# Patient Record
Sex: MALE | Race: OTHER | URBAN - METROPOLITAN AREA
[De-identification: names, ages, dates, MRNs, and addresses within clinical notes are randomized per-mention and may not be internally consistent; named-entity substitution may affect disease eponyms.]

---

## 2019-05-26 ENCOUNTER — EMERGENCY (EMERGENCY)
Facility: HOSPITAL | Age: 34
LOS: 0 days | Discharge: HOME | End: 2019-05-26
Payer: MEDICAID

## 2019-05-26 VITALS
OXYGEN SATURATION: 100 % | HEART RATE: 67 BPM | SYSTOLIC BLOOD PRESSURE: 120 MMHG | DIASTOLIC BLOOD PRESSURE: 69 MMHG | TEMPERATURE: 98 F | RESPIRATION RATE: 18 BRPM

## 2019-05-26 VITALS
SYSTOLIC BLOOD PRESSURE: 134 MMHG | HEIGHT: 72 IN | OXYGEN SATURATION: 100 % | DIASTOLIC BLOOD PRESSURE: 57 MMHG | RESPIRATION RATE: 16 BRPM | WEIGHT: 184.97 LBS | HEART RATE: 62 BPM | TEMPERATURE: 98 F
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
## 2019-05-26 DIAGNOSIS — M25.572 PAIN IN LEFT ANKLE AND JOINTS OF LEFT FOOT: ICD-10-CM

## 2019-05-26 DIAGNOSIS — S82.832A OTHER FRACTURE OF UPPER AND LOWER END OF LEFT FIBULA, INITIAL ENCOUNTER FOR CLOSED FRACTURE: ICD-10-CM

## 2019-05-26 PROCEDURE — 73600 X-RAY EXAM OF ANKLE: CPT | Mod: 26,59,LT

## 2019-05-26 PROCEDURE — 73590 X-RAY EXAM OF LOWER LEG: CPT | Mod: 26,LT

## 2019-05-26 PROCEDURE — 99284 EMERGENCY DEPT VISIT MOD MDM: CPT

## 2019-05-26 PROCEDURE — 73610 X-RAY EXAM OF ANKLE: CPT | Mod: 26,76,LT

## 2019-05-26 PROCEDURE — 73630 X-RAY EXAM OF FOOT: CPT | Mod: 26,LT

## 2019-05-26 RX ORDER — ACETAMINOPHEN 500 MG
2 TABLET ORAL
Qty: 20 | Refills: 0
Start: 2019-05-26

## 2019-05-26 RX ORDER — OXYCODONE AND ACETAMINOPHEN 5; 325 MG/1; MG/1
1 TABLET ORAL ONCE
Refills: 0 | Status: DISCONTINUED | OUTPATIENT
Start: 2019-05-26 | End: 2019-05-26

## 2019-05-26 RX ADMIN — OXYCODONE AND ACETAMINOPHEN 1 TABLET(S): 5; 325 TABLET ORAL at 20:44

## 2019-05-26 RX ADMIN — OXYCODONE AND ACETAMINOPHEN 1 TABLET(S): 5; 325 TABLET ORAL at 19:44

## 2019-05-26 NOTE — ED ADULT NURSE NOTE - OBJECTIVE STATEMENT
pt received to [facility] c/o pain in left ankle after falling off a skateboard and twisting it today. pt c/o slight pain in left knee and states he was lightheaded after he fell.  denies: hitting head, LOC, n/v, dizzy. c/o headache, difficulty moving left ankle.  family at bedside

## 2019-05-26 NOTE — ED PROVIDER NOTE - OBJECTIVE STATEMENT
35 y/o male with no PMH here c/o L ankle pain and swelling s/p fall off skateboard x 2 hours ago. denies hitting head. pt states he was unable to bear weight since the fall. pt otherwise has no other complaints.    ROS: No fever/chills. No eye pain/changes in vision, No ear pain/sore throat/dysphagia, No chest pain/palpitations. No SOB/cough/. No abdominal pain, N/V/D, no black/bloody bm. No dysuria/frequency/discharge, No headache. No Dizziness.    No rashes or breaks in skin. No numbness/tingling/weakness.

## 2019-05-26 NOTE — CONSULT NOTE ADULT - ASSESSMENT
A/P: 34y Male with L ankle fracture  Pain control  NWB LLE in splint, keep c/d/I, cane/crutches/walker as needed  Ice/elevation  Si/sx compartment syndrome discussed with patient, told to return to ED if exhibit any  Likely need for surgical intervention in future discussed, all questions answered  Follow up with Dr. Rosenthal within 1 week, call office for appointment  Ortho stable for d/c

## 2019-05-26 NOTE — ED PROVIDER NOTE - PHYSICAL EXAMINATION
Gen: Alert, NAD, well appearing  Head: NC, AT, PERRL, EOMI, normal lids/conjunctiva  ENT: B TM WNL, normal hearing, patent oropharynx without erythema/exudate, uvula midline  Neck: +supple, no tenderness/meningismus/JVD, +Trachea midline  Pulm: Bilateral BS, normal resp effort, no wheeze/stridor/retractions  CV: RRR, no M/R/G, +dist pulses  Abd: soft, NT/ND, +BS, no hepatosplenomegaly  Mskel: no erythema/cyanosis, +ttp and swelling  L lateral mal, sensations intact, compartments soft, able to wiggle all toes, good pulses  Skin: no rash  Neuro: AAOx3, no sensory/motor deficits, CN 2-12 intact

## 2019-05-26 NOTE — CONSULT NOTE ADULT - SUBJECTIVE AND OBJECTIVE BOX
34y Male community ambulator presents c/o L ankle pain sp mechanical fall. Pt was skateboarding when he fell and twisted L ankle. Unable to bear weight after the incident. Denies HS/LOC. Denies numbness/tingling. No other pain/injuries. No previous orthopedic surgery history.    CONSTITUTIONAL: No fever or chills  HEENT:  No headache, no sore throat  RESPIRATORY: No cough, wheezing, or shortness of breath  CARDIOVASCULAR: No chest pain, palpitations, or leg swelling  GASTROINTESTINAL: No nausea, vomiting, or diarrhea  GENITOURINARY: No dysuria, frequency, or hematuria  NEUROLOGICAL: no focal weakness or dizziness  SKIN:  No rashes or lesions   MUSCULOSKELETAL: no myalgias   PSYCHIATRIC: No depression or anxiety    PAST MEDICAL & SURGICAL HISTORY:  Denies PMH  Previous surgery on jaw    MEDICATIONS  (STANDING):    Allergies    No Known Allergies    Intolerances        Vital Signs Last 24 Hrs  T(C): 36.6 (05-26-19 @ 18:49), Max: 36.6 (05-26-19 @ 18:49)  T(F): 97.9 (05-26-19 @ 18:49), Max: 97.9 (05-26-19 @ 18:49)  HR: 62 (05-26-19 @ 18:49) (62 - 62)  BP: 134/57 (05-26-19 @ 18:49) (134/57 - 134/57)  BP(mean): --  RR: 16 (05-26-19 @ 18:49) (16 - 16)  SpO2: 100% (05-26-19 @ 18:49) (100% - 100%)    Imaging: XR demonstrates L trimalleolar equivalent ankle fracture    Physical Exam  Gen: Nad  LLE: Skin intact, +TTP medial/lateral malleolus, +significant swelling, no bony ttp elsewhere, +ehl/fhl/ta/gs function, L3-S1 SILT, dp/pt pulse intact, negative log roll, able to SLR, compartments soft/compressive, extremity warm/well perfused    Secondary Survey: Benign, no bony ttp elsewhere, NV intact    Procedure: 10cc 1% lidocaine injected under sterile procedure into L ankle joint. Closed reduction performed. Placed in well padded trilam splint. Post procedure imaging obtained. Post procedure exam unchanged, NV intact, able to move all toes, SILT distally.

## 2019-06-05 ENCOUNTER — OUTPATIENT (OUTPATIENT)
Dept: OUTPATIENT SERVICES | Facility: HOSPITAL | Age: 34
LOS: 1 days | Discharge: HOME | End: 2019-06-05

## 2019-06-05 VITALS
DIASTOLIC BLOOD PRESSURE: 77 MMHG | WEIGHT: 189.6 LBS | TEMPERATURE: 99 F | SYSTOLIC BLOOD PRESSURE: 126 MMHG | OXYGEN SATURATION: 97 % | RESPIRATION RATE: 18 BRPM | HEART RATE: 54 BPM | HEIGHT: 72 IN

## 2019-06-05 DIAGNOSIS — Z01.818 ENCOUNTER FOR OTHER PREPROCEDURAL EXAMINATION: ICD-10-CM

## 2019-06-05 DIAGNOSIS — Z98.890 OTHER SPECIFIED POSTPROCEDURAL STATES: Chronic | ICD-10-CM

## 2019-06-05 DIAGNOSIS — S82.852D DISPLACED TRIMALLEOLAR FRACTURE OF LEFT LOWER LEG, SUBSEQUENT ENCOUNTER FOR CLOSED FRACTURE WITH ROUTINE HEALING: ICD-10-CM

## 2019-06-05 DIAGNOSIS — J45.909 UNSPECIFIED ASTHMA, UNCOMPLICATED: ICD-10-CM

## 2019-06-05 DIAGNOSIS — S02.609A FRACTURE OF MANDIBLE, UNSPECIFIED, INITIAL ENCOUNTER FOR CLOSED FRACTURE: Chronic | ICD-10-CM

## 2019-06-05 LAB
ANION GAP SERPL CALC-SCNC: 7 MMOL/L — SIGNIFICANT CHANGE UP (ref 5–17)
BASOPHILS # BLD AUTO: 0.07 K/UL — SIGNIFICANT CHANGE UP (ref 0–0.2)
BASOPHILS NFR BLD AUTO: 1.5 % — SIGNIFICANT CHANGE UP (ref 0–2)
BUN SERPL-MCNC: 12 MG/DL — SIGNIFICANT CHANGE UP (ref 7–23)
CALCIUM SERPL-MCNC: 9.4 MG/DL — SIGNIFICANT CHANGE UP (ref 8.5–10.1)
CHLORIDE SERPL-SCNC: 107 MMOL/L — SIGNIFICANT CHANGE UP (ref 96–108)
CO2 SERPL-SCNC: 30 MMOL/L — SIGNIFICANT CHANGE UP (ref 22–31)
CREAT SERPL-MCNC: 1.22 MG/DL — SIGNIFICANT CHANGE UP (ref 0.5–1.3)
EOSINOPHIL # BLD AUTO: 0.23 K/UL — SIGNIFICANT CHANGE UP (ref 0–0.5)
EOSINOPHIL NFR BLD AUTO: 4.9 % — SIGNIFICANT CHANGE UP (ref 0–6)
GLUCOSE SERPL-MCNC: 98 MG/DL — SIGNIFICANT CHANGE UP (ref 70–99)
HCT VFR BLD CALC: 47.8 % — SIGNIFICANT CHANGE UP (ref 39–50)
HGB BLD-MCNC: 15.2 G/DL — SIGNIFICANT CHANGE UP (ref 13–17)
IMM GRANULOCYTES NFR BLD AUTO: 0.2 % — SIGNIFICANT CHANGE UP (ref 0–1.5)
LYMPHOCYTES # BLD AUTO: 1.46 K/UL — SIGNIFICANT CHANGE UP (ref 1–3.3)
LYMPHOCYTES # BLD AUTO: 31.2 % — SIGNIFICANT CHANGE UP (ref 13–44)
MCHC RBC-ENTMCNC: 29.6 PG — SIGNIFICANT CHANGE UP (ref 27–34)
MCHC RBC-ENTMCNC: 31.8 GM/DL — LOW (ref 32–36)
MCV RBC AUTO: 93.2 FL — SIGNIFICANT CHANGE UP (ref 80–100)
MONOCYTES # BLD AUTO: 0.42 K/UL — SIGNIFICANT CHANGE UP (ref 0–0.9)
MONOCYTES NFR BLD AUTO: 9 % — SIGNIFICANT CHANGE UP (ref 2–14)
NEUTROPHILS # BLD AUTO: 2.49 K/UL — SIGNIFICANT CHANGE UP (ref 1.8–7.4)
NEUTROPHILS NFR BLD AUTO: 53.2 % — SIGNIFICANT CHANGE UP (ref 43–77)
NRBC # BLD: 0 /100 WBCS — SIGNIFICANT CHANGE UP (ref 0–0)
PLATELET # BLD AUTO: 216 K/UL — SIGNIFICANT CHANGE UP (ref 150–400)
POTASSIUM SERPL-MCNC: 4.5 MMOL/L — SIGNIFICANT CHANGE UP (ref 3.5–5.3)
POTASSIUM SERPL-SCNC: 4.5 MMOL/L — SIGNIFICANT CHANGE UP (ref 3.5–5.3)
RBC # BLD: 5.13 M/UL — SIGNIFICANT CHANGE UP (ref 4.2–5.8)
RBC # FLD: 12.4 % — SIGNIFICANT CHANGE UP (ref 10.3–14.5)
SODIUM SERPL-SCNC: 144 MMOL/L — SIGNIFICANT CHANGE UP (ref 135–145)
WBC # BLD: 4.68 K/UL — SIGNIFICANT CHANGE UP (ref 3.8–10.5)
WBC # FLD AUTO: 4.68 K/UL — SIGNIFICANT CHANGE UP (ref 3.8–10.5)

## 2019-06-05 NOTE — H&P PST ADULT - NSICDXPROBLEM_GEN_ALL_CORE_FT
PROBLEM DIAGNOSES  Problem: Displaced trimalleolar fracture of left lower leg, subsequent encounter for closed fracture with routine healing  Assessment and Plan: Open reduction internal fixation left ankle  Pre-op instructions given by RN, patient verbalized understanding  Chlorhexidine wash instructions given       Problem: Childhood asthma  Assessment and Plan: never intubated, currnetly on no meds

## 2019-06-05 NOTE — H&P PST ADULT - ASSESSMENT
34M pmh childhood asthma (never intubated) reports trip and fall while skateboarding on 19 went to ED where he was found to have displaced trimalleolar fracture of left lower let here for PST for scheduled Open reduction internal fixation of left ankle  CAPRINI SCORE    AGE RELATED RISK FACTORS                                                       MOBILITY RELATED FACTORS  [ ] Age 41-60 years                                            (1 Point)                  [ ] Bed rest                                                        (1 Point)  [ ] Age: 61-74 years                                           (2 Points)                [x ] Plaster cast                                                   (2 Points)  [ ] Age= 75 years                                              (3 Points)                 [ ] Bed bound for more than 72 hours                   (2 Points)    DISEASE RELATED RISK FACTORS                                               GENDER SPECIFIC FACTORS  [x ] Edema in the lower extremities                       (1 Point)                  [ ] Pregnancy                                                     (1 Point)  [ ] Varicose veins                                               (1 Point)                  [ ] Post-partum < 6 weeks                                   (1 Point)             [ ] BMI > 25 Kg/m2                                            (1 Point)                  [ ] Hormonal therapy  or oral contraception            (1 Point)                 [ ] Sepsis (in the previous month)                        (1 Point)                  [ ] History of pregnancy complications  [ ] Pneumonia or serious lung disease                                               [ ] Unexplained or recurrent                       (1 Point)           (in the previous month)                               (1 Point)  [ ] Abnormal pulmonary function test                     (1 Point)                 SURGERY RELATED RISK FACTORS  [ ] Acute myocardial infarction                              (1 Point)                 [ ]  Section                                            (1 Point)  [ ] Congestive heart failure (in the previous month)  (1 Point)                 [ ] Minor surgery                                                 (1 Point)   [ ] Inflammatory bowel disease                             (1 Point)                 [ ] Arthroscopic surgery                                        (2 Points)  [ ] Central venous access                                    (2 Points)                [x ] General surgery lasting more than 45 minutes   (2 Points)       [ ] Stroke (in the previous month)                          (5 Points)               [ ] Elective arthroplasty                                        (5 Points)                                                                                                                                               HEMATOLOGY RELATED FACTORS                                                 TRAUMA RELATED RISK FACTORS  [ ] Prior episodes of VTE                                     (3 Points)                 [ ] Fracture of the hip, pelvis, or leg                       (5 Points)  [ ] Positive family history for VTE                         (3 Points)                 [ ] Acute spinal cord injury (in the previous month)  (5 Points)  [ ] Prothrombin 66397 A                                      (3 Points)                 [ ] Paralysis  (less than 1 month)                          (5 Points)  [ ] Factor V Leiden                                             (3 Points)                 [ ] Multiple Trauma within 1 month                         (5 Points)  [ ] Lupus anticoagulants                                     (3 Points)                                                           [ ] Anticardiolipin antibodies                                (3 Points)                                                       [ ] High homocysteine in the blood                      (3 Points)                                             [ ] Other congenital or acquired thrombophilia       (3 Points)                                                [ ] Heparin induced thrombocytopenia                  (3 Points)                                          Total Score [  5        ]

## 2019-06-05 NOTE — H&P PST ADULT - HISTORY OF PRESENT ILLNESS
The patient is a 11y1m Female complaining of lacerations. 34M PMH 34M pmh childhood asthma (never intubated) reports trip and fall while skateboarding on 5-26-19 went to ED where he was found to have displaced trimalleolar fracture of left lower let here for PST for scheduled Open reduction internal fixation of left ankle

## 2019-06-07 ENCOUNTER — INPATIENT (INPATIENT)
Facility: HOSPITAL | Age: 34
LOS: 1 days | Discharge: HOME | End: 2019-06-09
Attending: ORTHOPAEDIC SURGERY | Admitting: ORTHOPAEDIC SURGERY

## 2019-06-07 VITALS
RESPIRATION RATE: 18 BRPM | HEART RATE: 57 BPM | WEIGHT: 178.57 LBS | OXYGEN SATURATION: 98 % | SYSTOLIC BLOOD PRESSURE: 108 MMHG | TEMPERATURE: 97 F | DIASTOLIC BLOOD PRESSURE: 51 MMHG

## 2019-06-07 DIAGNOSIS — S02.609A FRACTURE OF MANDIBLE, UNSPECIFIED, INITIAL ENCOUNTER FOR CLOSED FRACTURE: Chronic | ICD-10-CM

## 2019-06-07 DIAGNOSIS — Z98.890 OTHER SPECIFIED POSTPROCEDURAL STATES: Chronic | ICD-10-CM

## 2019-06-07 RX ORDER — ONDANSETRON 8 MG/1
4 TABLET, FILM COATED ORAL EVERY 6 HOURS
Refills: 0 | Status: DISCONTINUED | OUTPATIENT
Start: 2019-06-07 | End: 2019-06-09

## 2019-06-07 RX ORDER — FOLIC ACID 0.8 MG
1 TABLET ORAL DAILY
Refills: 0 | Status: DISCONTINUED | OUTPATIENT
Start: 2019-06-07 | End: 2019-06-09

## 2019-06-07 RX ORDER — ACETAMINOPHEN 500 MG
1000 TABLET ORAL ONCE
Refills: 0 | Status: COMPLETED | OUTPATIENT
Start: 2019-06-07 | End: 2019-06-07

## 2019-06-07 RX ORDER — ASPIRIN/CALCIUM CARB/MAGNESIUM 324 MG
325 TABLET ORAL
Refills: 0 | Status: DISCONTINUED | OUTPATIENT
Start: 2019-06-08 | End: 2019-06-09

## 2019-06-07 RX ORDER — SODIUM CHLORIDE 9 MG/ML
3 INJECTION INTRAMUSCULAR; INTRAVENOUS; SUBCUTANEOUS EVERY 8 HOURS
Refills: 0 | Status: DISCONTINUED | OUTPATIENT
Start: 2019-06-07 | End: 2019-06-07

## 2019-06-07 RX ORDER — ZOLPIDEM TARTRATE 10 MG/1
5 TABLET ORAL AT BEDTIME
Refills: 0 | Status: DISCONTINUED | OUTPATIENT
Start: 2019-06-07 | End: 2019-06-09

## 2019-06-07 RX ORDER — IBUPROFEN 200 MG
1 TABLET ORAL
Qty: 0 | Refills: 0 | DISCHARGE

## 2019-06-07 RX ORDER — HYDROMORPHONE HYDROCHLORIDE 2 MG/ML
0.5 INJECTION INTRAMUSCULAR; INTRAVENOUS; SUBCUTANEOUS
Refills: 0 | Status: DISCONTINUED | OUTPATIENT
Start: 2019-06-07 | End: 2019-06-07

## 2019-06-07 RX ORDER — MAGNESIUM HYDROXIDE 400 MG/1
30 TABLET, CHEWABLE ORAL DAILY
Refills: 0 | Status: DISCONTINUED | OUTPATIENT
Start: 2019-06-07 | End: 2019-06-09

## 2019-06-07 RX ORDER — CEFAZOLIN SODIUM 1 G
2000 VIAL (EA) INJECTION EVERY 8 HOURS
Refills: 0 | Status: COMPLETED | OUTPATIENT
Start: 2019-06-07 | End: 2019-06-07

## 2019-06-07 RX ORDER — OXYCODONE HYDROCHLORIDE 5 MG/1
10 TABLET ORAL EVERY 4 HOURS
Refills: 0 | Status: DISCONTINUED | OUTPATIENT
Start: 2019-06-07 | End: 2019-06-09

## 2019-06-07 RX ORDER — ASCORBIC ACID 60 MG
500 TABLET,CHEWABLE ORAL
Refills: 0 | Status: DISCONTINUED | OUTPATIENT
Start: 2019-06-07 | End: 2019-06-09

## 2019-06-07 RX ORDER — ASPIRIN/CALCIUM CARB/MAGNESIUM 324 MG
1 TABLET ORAL
Qty: 60 | Refills: 0
Start: 2019-06-07 | End: 2019-07-06

## 2019-06-07 RX ORDER — SODIUM CHLORIDE 9 MG/ML
1000 INJECTION, SOLUTION INTRAVENOUS
Refills: 0 | Status: DISCONTINUED | OUTPATIENT
Start: 2019-06-07 | End: 2019-06-07

## 2019-06-07 RX ORDER — METOCLOPRAMIDE HCL 10 MG
10 TABLET ORAL ONCE
Refills: 0 | Status: DISCONTINUED | OUTPATIENT
Start: 2019-06-07 | End: 2019-06-07

## 2019-06-07 RX ORDER — SODIUM CHLORIDE 9 MG/ML
1000 INJECTION, SOLUTION INTRAVENOUS
Refills: 0 | Status: DISCONTINUED | OUTPATIENT
Start: 2019-06-07 | End: 2019-06-08

## 2019-06-07 RX ORDER — KETOROLAC TROMETHAMINE 30 MG/ML
30 SYRINGE (ML) INJECTION ONCE
Refills: 0 | Status: DISCONTINUED | OUTPATIENT
Start: 2019-06-07 | End: 2019-06-07

## 2019-06-07 RX ORDER — FERROUS SULFATE 325(65) MG
325 TABLET ORAL
Refills: 0 | Status: DISCONTINUED | OUTPATIENT
Start: 2019-06-07 | End: 2019-06-09

## 2019-06-07 RX ORDER — DIPHENHYDRAMINE HCL 50 MG
50 CAPSULE ORAL EVERY 4 HOURS
Refills: 0 | Status: DISCONTINUED | OUTPATIENT
Start: 2019-06-07 | End: 2019-06-09

## 2019-06-07 RX ORDER — FAMOTIDINE 10 MG/ML
20 INJECTION INTRAVENOUS EVERY 12 HOURS
Refills: 0 | Status: DISCONTINUED | OUTPATIENT
Start: 2019-06-07 | End: 2019-06-09

## 2019-06-07 RX ORDER — ACETAMINOPHEN 500 MG
650 TABLET ORAL EVERY 6 HOURS
Refills: 0 | Status: DISCONTINUED | OUTPATIENT
Start: 2019-06-07 | End: 2019-06-09

## 2019-06-07 RX ORDER — HYDROMORPHONE HYDROCHLORIDE 2 MG/ML
1 INJECTION INTRAMUSCULAR; INTRAVENOUS; SUBCUTANEOUS EVERY 4 HOURS
Refills: 0 | Status: DISCONTINUED | OUTPATIENT
Start: 2019-06-07 | End: 2019-06-08

## 2019-06-07 RX ORDER — OXYCODONE HYDROCHLORIDE 5 MG/1
5 TABLET ORAL EVERY 4 HOURS
Refills: 0 | Status: DISCONTINUED | OUTPATIENT
Start: 2019-06-07 | End: 2019-06-09

## 2019-06-07 RX ADMIN — Medication 325 MILLIGRAM(S): at 11:32

## 2019-06-07 RX ADMIN — HYDROMORPHONE HYDROCHLORIDE 1 MILLIGRAM(S): 2 INJECTION INTRAMUSCULAR; INTRAVENOUS; SUBCUTANEOUS at 22:51

## 2019-06-07 RX ADMIN — Medication 1 TABLET(S): at 11:32

## 2019-06-07 RX ADMIN — OXYCODONE HYDROCHLORIDE 10 MILLIGRAM(S): 5 TABLET ORAL at 14:15

## 2019-06-07 RX ADMIN — Medication 1000 MILLIGRAM(S): at 21:36

## 2019-06-07 RX ADMIN — HYDROMORPHONE HYDROCHLORIDE 0.5 MILLIGRAM(S): 2 INJECTION INTRAMUSCULAR; INTRAVENOUS; SUBCUTANEOUS at 17:39

## 2019-06-07 RX ADMIN — OXYCODONE HYDROCHLORIDE 10 MILLIGRAM(S): 5 TABLET ORAL at 20:29

## 2019-06-07 RX ADMIN — HYDROMORPHONE HYDROCHLORIDE 0.5 MILLIGRAM(S): 2 INJECTION INTRAMUSCULAR; INTRAVENOUS; SUBCUTANEOUS at 14:42

## 2019-06-07 RX ADMIN — OXYCODONE HYDROCHLORIDE 10 MILLIGRAM(S): 5 TABLET ORAL at 19:29

## 2019-06-07 RX ADMIN — SODIUM CHLORIDE 75 MILLILITER(S): 9 INJECTION, SOLUTION INTRAVENOUS at 14:31

## 2019-06-07 RX ADMIN — Medication 1 MILLIGRAM(S): at 11:32

## 2019-06-07 RX ADMIN — HYDROMORPHONE HYDROCHLORIDE 1 MILLIGRAM(S): 2 INJECTION INTRAMUSCULAR; INTRAVENOUS; SUBCUTANEOUS at 23:06

## 2019-06-07 RX ADMIN — HYDROMORPHONE HYDROCHLORIDE 0.5 MILLIGRAM(S): 2 INJECTION INTRAMUSCULAR; INTRAVENOUS; SUBCUTANEOUS at 14:27

## 2019-06-07 RX ADMIN — Medication 400 MILLIGRAM(S): at 20:36

## 2019-06-07 RX ADMIN — Medication 100 MILLIGRAM(S): at 15:48

## 2019-06-07 RX ADMIN — HYDROMORPHONE HYDROCHLORIDE 0.5 MILLIGRAM(S): 2 INJECTION INTRAMUSCULAR; INTRAVENOUS; SUBCUTANEOUS at 09:50

## 2019-06-07 RX ADMIN — HYDROMORPHONE HYDROCHLORIDE 0.5 MILLIGRAM(S): 2 INJECTION INTRAMUSCULAR; INTRAVENOUS; SUBCUTANEOUS at 10:20

## 2019-06-07 RX ADMIN — HYDROMORPHONE HYDROCHLORIDE 0.5 MILLIGRAM(S): 2 INJECTION INTRAMUSCULAR; INTRAVENOUS; SUBCUTANEOUS at 11:02

## 2019-06-07 RX ADMIN — OXYCODONE HYDROCHLORIDE 10 MILLIGRAM(S): 5 TABLET ORAL at 13:15

## 2019-06-07 RX ADMIN — Medication 100 MILLIGRAM(S): at 23:16

## 2019-06-07 RX ADMIN — HYDROMORPHONE HYDROCHLORIDE 0.5 MILLIGRAM(S): 2 INJECTION INTRAMUSCULAR; INTRAVENOUS; SUBCUTANEOUS at 10:00

## 2019-06-07 RX ADMIN — HYDROMORPHONE HYDROCHLORIDE 0.5 MILLIGRAM(S): 2 INJECTION INTRAMUSCULAR; INTRAVENOUS; SUBCUTANEOUS at 17:54

## 2019-06-07 RX ADMIN — Medication 325 MILLIGRAM(S): at 17:44

## 2019-06-07 RX ADMIN — Medication 500 MILLIGRAM(S): at 17:44

## 2019-06-07 RX ADMIN — Medication 30 MILLIGRAM(S): at 23:53

## 2019-06-07 RX ADMIN — SODIUM CHLORIDE 75 MILLILITER(S): 9 INJECTION, SOLUTION INTRAVENOUS at 09:50

## 2019-06-07 RX ADMIN — FAMOTIDINE 20 MILLIGRAM(S): 10 INJECTION INTRAVENOUS at 17:44

## 2019-06-07 RX ADMIN — HYDROMORPHONE HYDROCHLORIDE 0.5 MILLIGRAM(S): 2 INJECTION INTRAMUSCULAR; INTRAVENOUS; SUBCUTANEOUS at 11:17

## 2019-06-07 NOTE — PROGRESS NOTE ADULT - SUBJECTIVE AND OBJECTIVE BOX
Orthopaedic Surgery Post-Operative Progress Note    Pt reports pain is well controlled, but worsened with PT. Tolerated procedure well. Denies fevers, dizziness, CP, SOB, N/V, numbness/tingling, calf pain.    Vitals:  T(C): 36 (06-07-19 @ 14:11), Max: 36.7 (06-07-19 @ 09:25)  HR: 77 (06-07-19 @ 15:34) (57 - 89)  BP: 154/79 (06-07-19 @ 15:34) (108/51 - 154/79)  RR: 20 (06-07-19 @ 14:11) (18 - 21)  SpO2: 96% (06-07-19 @ 15:34) (95% - 98%)    Physical Exam:  General: NAD, Resting Comfortably    LLE:  In Trilam Splint  SILT to toes  EHL/FHL intact  DP/PT 2+  Accesible Compartments Soft and Compressible  No calf tenderness

## 2019-06-07 NOTE — PROGRESS NOTE ADULT - ASSESSMENT
S/P ORIF  Lt ankle Fx    Plan:  NWBA LLE with crutches  Pain managements  DVT prophylaxis  F/U labs  Elevation and ice LLE  NV and compartments check LLE  May D/C home tomorrow if stable and follow as out patient.

## 2019-06-07 NOTE — DISCHARGE NOTE PROVIDER - CARE PROVIDER_API CALL
Brent Rosenthal (DO)  Orthopaedic Surgery  [address] [place] [address]  Phone: [phone]  Fax: [phone]  Follow Up Time:

## 2019-06-07 NOTE — PHYSICAL THERAPY INITIAL EVALUATION ADULT - ADDITIONAL COMMENTS
Patient lives in a house with his parents c 1 flight and R rail to enter.  Once inside, patient has another flight and R rail to get to his bedroom.  Patient is independent with ambulation and ADLs.

## 2019-06-07 NOTE — PHYSICAL THERAPY INITIAL EVALUATION ADULT - GAIT TRAINING, PT EVAL
Patient will ambulate 500 feet with axillary crutches independently for community ambulation in 2-3 days. Patient will ascend/descend 10 steps with R rail up/L rail down and axillary crutches independently in 2-3 days to get in and out of home safely.

## 2019-06-07 NOTE — BRIEF OPERATIVE NOTE - NSICDXBRIEFPROCEDURE_GEN_ALL_CORE_FT
PROCEDURES:  Open insertion of internal fixation device into left ankle joint 07-Jun-2019 09:42:37  Juliocesar Burciaga

## 2019-06-07 NOTE — PROGRESS NOTE ADULT - SUBJECTIVE AND OBJECTIVE BOX
Patient seen and examined.  Complaining of Lt ankle pain    PE:  Dressing and splint  D/C/I  NVI LLE  Compartments soft B/L LE  FHL/EHL intact LLE

## 2019-06-07 NOTE — DISCHARGE NOTE PROVIDER - NSDCFUADDINST_GEN_ALL_CORE_FT
1.	Pain Control  2.	Non-weight bearing affected extremity, with assistive devices as needed  3.	DVT Prophylaxis  4.	PT as needed  5.	Follow up with Dr. Rosenthal as Outpatient in 10-14 Days after Discharge from the Hospital or Rehab. Call Office For Appointment.  6.	Staples/Sutures to be removed Post-Op Day 14, and repeat x-rays  7.	Ice/Elevate affected area as needed  8.	Keep Splint clean and dry

## 2019-06-07 NOTE — PHYSICAL THERAPY INITIAL EVALUATION ADULT - BED MOBILITY TRAINING, PT EVAL
Pt will independently perform all aspects of bed mobility to help prevent pressure ulcers, by 2 days.

## 2019-06-07 NOTE — PROGRESS NOTE ADULT - ASSESSMENT
Pt is a 34y Male POD 0 from L Ankle ORIF .  -Stable  -Tolerated procedure well  -Pain Control  -DVT PPx Starting POD 1  -Continue Post-Op Abx   -Encourage Incentive Spirometry  -NWB LLE in Trilam splint  -PT/OT  -Med Management  -Dispo Planning--home tomorrow  -Will discuss with attending and advise if plan changes.    Marta Mathias M.D.  PGY-1 Orthopaedic Surgery

## 2019-06-07 NOTE — DISCHARGE NOTE PROVIDER - NSDCCPCAREPLAN_GEN_ALL_CORE_FT
PRINCIPAL DISCHARGE DIAGNOSIS  Diagnosis: Ankle fracture  Assessment and Plan of Treatment: PRINCIPAL DISCHARGE DIAGNOSIS  Diagnosis: Ankle fracture  Assessment and Plan of Treatment: 1.	Pain Control  2.	Non-weight bearing affected extremity, with assistive devices as needed  3.	DVT Prophylaxis  4.	PT as needed  5.	Follow up with Dr. Rosenthal as Outpatient in 10-14 Days after Discharge from the Hospital or Rehab. Call Office For Appointment.  6.	Staples/Sutures to be removed Post-Op Day 14, and repeat x-rays  7.	Ice/Elevate affected area as needed  8.	Keep Splint clean and dry

## 2019-06-07 NOTE — DISCHARGE NOTE PROVIDER - HOSPITAL COURSE
The patient is a 34M status post open reduction internal fixation of a left ankle fracture after being admitted to the hospital. The patient was medically optimized for the previously mentioned surgical procedure. The patient was taken to the operating room on date mentioned above. Prophylactic antibiotics were started before the procedure and continued.  There were no complications during the procedure and patient tolerated the procedure well.  The patient was transferred to recovery room in stable condition and subsequently to surgical floor.  Patient was placed on aspirin for anticoagulation.  All home medications were continued.  The dressing was kept clean, dry, and intact.  The rest of the hospital stay was unremarkable. The patient was discharged in stable condition to follow up as outpatient.

## 2019-06-08 RX ORDER — ACETAMINOPHEN 500 MG
1000 TABLET ORAL ONCE
Refills: 0 | Status: COMPLETED | OUTPATIENT
Start: 2019-06-08 | End: 2019-06-08

## 2019-06-08 RX ORDER — ACETAMINOPHEN 500 MG
1000 TABLET ORAL ONCE
Refills: 0 | Status: COMPLETED | OUTPATIENT
Start: 2019-06-08 | End: 2019-06-09

## 2019-06-08 RX ORDER — KETOROLAC TROMETHAMINE 30 MG/ML
30 SYRINGE (ML) INJECTION ONCE
Refills: 0 | Status: DISCONTINUED | OUTPATIENT
Start: 2019-06-08 | End: 2019-06-08

## 2019-06-08 RX ADMIN — Medication 325 MILLIGRAM(S): at 08:58

## 2019-06-08 RX ADMIN — OXYCODONE HYDROCHLORIDE 10 MILLIGRAM(S): 5 TABLET ORAL at 22:51

## 2019-06-08 RX ADMIN — OXYCODONE HYDROCHLORIDE 10 MILLIGRAM(S): 5 TABLET ORAL at 14:46

## 2019-06-08 RX ADMIN — Medication 400 MILLIGRAM(S): at 20:53

## 2019-06-08 RX ADMIN — OXYCODONE HYDROCHLORIDE 10 MILLIGRAM(S): 5 TABLET ORAL at 09:19

## 2019-06-08 RX ADMIN — Medication 1000 MILLIGRAM(S): at 21:20

## 2019-06-08 RX ADMIN — OXYCODONE HYDROCHLORIDE 10 MILLIGRAM(S): 5 TABLET ORAL at 19:21

## 2019-06-08 RX ADMIN — Medication 500 MILLIGRAM(S): at 05:28

## 2019-06-08 RX ADMIN — Medication 30 MILLIGRAM(S): at 00:08

## 2019-06-08 RX ADMIN — HYDROMORPHONE HYDROCHLORIDE 1 MILLIGRAM(S): 2 INJECTION INTRAMUSCULAR; INTRAVENOUS; SUBCUTANEOUS at 12:05

## 2019-06-08 RX ADMIN — FAMOTIDINE 20 MILLIGRAM(S): 10 INJECTION INTRAVENOUS at 17:48

## 2019-06-08 RX ADMIN — Medication 30 MILLIGRAM(S): at 16:16

## 2019-06-08 RX ADMIN — Medication 325 MILLIGRAM(S): at 17:48

## 2019-06-08 RX ADMIN — OXYCODONE HYDROCHLORIDE 10 MILLIGRAM(S): 5 TABLET ORAL at 04:07

## 2019-06-08 RX ADMIN — OXYCODONE HYDROCHLORIDE 10 MILLIGRAM(S): 5 TABLET ORAL at 23:51

## 2019-06-08 RX ADMIN — Medication 325 MILLIGRAM(S): at 11:53

## 2019-06-08 RX ADMIN — FAMOTIDINE 20 MILLIGRAM(S): 10 INJECTION INTRAVENOUS at 05:28

## 2019-06-08 RX ADMIN — HYDROMORPHONE HYDROCHLORIDE 1 MILLIGRAM(S): 2 INJECTION INTRAMUSCULAR; INTRAVENOUS; SUBCUTANEOUS at 06:59

## 2019-06-08 RX ADMIN — OXYCODONE HYDROCHLORIDE 10 MILLIGRAM(S): 5 TABLET ORAL at 13:46

## 2019-06-08 RX ADMIN — OXYCODONE HYDROCHLORIDE 10 MILLIGRAM(S): 5 TABLET ORAL at 18:21

## 2019-06-08 RX ADMIN — Medication 1 TABLET(S): at 11:53

## 2019-06-08 RX ADMIN — Medication 325 MILLIGRAM(S): at 16:16

## 2019-06-08 RX ADMIN — Medication 500 MILLIGRAM(S): at 17:48

## 2019-06-08 RX ADMIN — OXYCODONE HYDROCHLORIDE 10 MILLIGRAM(S): 5 TABLET ORAL at 10:19

## 2019-06-08 RX ADMIN — OXYCODONE HYDROCHLORIDE 10 MILLIGRAM(S): 5 TABLET ORAL at 03:07

## 2019-06-08 RX ADMIN — HYDROMORPHONE HYDROCHLORIDE 1 MILLIGRAM(S): 2 INJECTION INTRAMUSCULAR; INTRAVENOUS; SUBCUTANEOUS at 11:50

## 2019-06-08 RX ADMIN — Medication 30 MILLIGRAM(S): at 16:31

## 2019-06-08 RX ADMIN — Medication 325 MILLIGRAM(S): at 05:28

## 2019-06-08 RX ADMIN — Medication 1 MILLIGRAM(S): at 11:53

## 2019-06-08 RX ADMIN — HYDROMORPHONE HYDROCHLORIDE 1 MILLIGRAM(S): 2 INJECTION INTRAMUSCULAR; INTRAVENOUS; SUBCUTANEOUS at 05:59

## 2019-06-08 NOTE — PROGRESS NOTE ADULT - ASSESSMENT
A/P: Pt is a 34y Male POD 1 from L Ankle ORIF.  -Pain Control  -DVT PPx  -WBAT  -PT/OT  -Encourage Incentive Spirometry  -Med Management  -Dispo Planning--home today  -Will discuss with attending and advise if plan changes    Marta Mathias M.D.  PGY-1 Orthopaedic Surgery

## 2019-06-08 NOTE — PROGRESS NOTE ADULT - SUBJECTIVE AND OBJECTIVE BOX
Orthopaedic Surgery Progress Note    Pt seen and examined at bedside. Pt reports pain is present but controlled. No acute overnight events. Denies fevers, dizziness, CP, SOB, N/V, numbness/tingling, calf pain.    Vitals:  T(C): 35.8 (06-08-19 @ 07:48), Max: 36.7 (06-07-19 @ 09:25)  HR: 51 (06-08-19 @ 07:48) (51 - 89)  BP: 106/56 (06-08-19 @ 07:48) (106/56 - 154/79)  RR: 17 (06-08-19 @ 07:48) (17 - 21)  SpO2: 100% (06-08-19 @ 07:48) (94% - 100%)    Physical Exam:  Gen: NAD, resting comfortably    LLE:  In trilam splint  +EHL/FHL  SILT L2-S1  +DP/PT Pulses  Accessible Compartments soft and compressible  No calf TTP B/L

## 2019-06-09 VITALS
SYSTOLIC BLOOD PRESSURE: 113 MMHG | HEART RATE: 58 BPM | RESPIRATION RATE: 16 BRPM | DIASTOLIC BLOOD PRESSURE: 53 MMHG | TEMPERATURE: 96 F | OXYGEN SATURATION: 98 %

## 2019-06-09 RX ORDER — KETOROLAC TROMETHAMINE 30 MG/ML
30 SYRINGE (ML) INJECTION ONCE
Refills: 0 | Status: DISCONTINUED | OUTPATIENT
Start: 2019-06-09 | End: 2019-06-09

## 2019-06-09 RX ADMIN — FAMOTIDINE 20 MILLIGRAM(S): 10 INJECTION INTRAVENOUS at 05:27

## 2019-06-09 RX ADMIN — Medication 1000 MILLIGRAM(S): at 05:41

## 2019-06-09 RX ADMIN — Medication 325 MILLIGRAM(S): at 05:27

## 2019-06-09 RX ADMIN — Medication 400 MILLIGRAM(S): at 05:26

## 2019-06-09 RX ADMIN — OXYCODONE HYDROCHLORIDE 10 MILLIGRAM(S): 5 TABLET ORAL at 09:20

## 2019-06-09 RX ADMIN — Medication 500 MILLIGRAM(S): at 05:27

## 2019-06-09 RX ADMIN — OXYCODONE HYDROCHLORIDE 10 MILLIGRAM(S): 5 TABLET ORAL at 03:22

## 2019-06-09 RX ADMIN — Medication 325 MILLIGRAM(S): at 07:57

## 2019-06-09 RX ADMIN — OXYCODONE HYDROCHLORIDE 10 MILLIGRAM(S): 5 TABLET ORAL at 08:42

## 2019-06-09 RX ADMIN — Medication 30 MILLIGRAM(S): at 09:20

## 2019-06-09 RX ADMIN — Medication 30 MILLIGRAM(S): at 09:08

## 2019-06-09 RX ADMIN — OXYCODONE HYDROCHLORIDE 10 MILLIGRAM(S): 5 TABLET ORAL at 04:14

## 2019-06-09 NOTE — DISCHARGE NOTE NURSING/CASE MANAGEMENT/SOCIAL WORK - NSDCDPATPORTLINK_GEN_ALL_CORE
You can access the [facility] Portal, offered by [facility], by registering with the following website: [email or web address]

## 2019-06-09 NOTE — PROGRESS NOTE ADULT - ASSESSMENT
A/P: Pt is a 34y Male POD 2 from L Ankle ORIF.  -Pain Control  -DVT PPx  -WBAT  -PT/OT  -Encourage Incentive Spirometry  -Med Management  -Dispo Planning--home today  -Will discuss with attending and advise if plan changes    Marta Mathias M.D.  PGY-1 Orthopaedic Surgery

## 2019-06-09 NOTE — PROGRESS NOTE ADULT - SUBJECTIVE AND OBJECTIVE BOX
Orthopaedic Surgery Progress Note    Pt seen and examined at bedside. Pt reports pain is present but controlled and improved from yesterday. No acute overnight events. Denies fevers, dizziness, CP, SOB, N/V, numbness/tingling, calf pain.    Vital Signs Last 24 Hrs  T(C): 36.1 (09 Jun 2019 05:15), Max: 36.7 (08 Jun 2019 16:03)  T(F): 96.9 (09 Jun 2019 05:15), Max: 98 (08 Jun 2019 16:03)  HR: 48 (09 Jun 2019 05:15) (45 - 60)  BP: 121/62 (09 Jun 2019 05:15) (96/54 - 121/62)  BP(mean): --  RR: 16 (09 Jun 2019 05:15) (15 - 117)  SpO2: 99% (09 Jun 2019 05:15) (95% - 99%)    Physical Exam:  Gen: NAD, resting comfortably    LLE:  In trilam splint  +EHL/FHL  SILT L2-S1  +DP/PT Pulses  Accessible Compartments soft and compressible  No calf TTP B/L

## 2019-06-09 NOTE — DISCHARGE NOTE NURSING/CASE MANAGEMENT/SOCIAL WORK - NSDCPEWEB_GEN_ALL_CORE
[facility] for Tobacco Control website --- [email or web address] website --- [email or web address]
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
## 2019-06-13 DIAGNOSIS — S82.852A DISPLACED TRIMALLEOLAR FRACTURE OF LEFT LOWER LEG, INITIAL ENCOUNTER FOR CLOSED FRACTURE: ICD-10-CM

## 2019-06-13 DIAGNOSIS — M79.662 PAIN IN LEFT LOWER LEG: ICD-10-CM

## 2022-05-09 NOTE — DISCHARGE NOTE PROVIDER - NSDCCONDITION_GEN_ALL_CORE
Stable Bed in lowest position, wheels locked, appropriate side rails in place/Call bell, personal items and telephone in reach/Instruct patient to call for assistance before getting out of bed or chair/Non-slip footwear when patient is out of bed/Physically safe environment - no spills, clutter or unnecessary equipment/Purposeful Proactive Rounding/Room/bathroom lighting operational, light cord in reach
